# Patient Record
Sex: MALE | URBAN - NONMETROPOLITAN AREA
[De-identification: names, ages, dates, MRNs, and addresses within clinical notes are randomized per-mention and may not be internally consistent; named-entity substitution may affect disease eponyms.]

---

## 2019-08-12 ENCOUNTER — OFFICE VISIT (OUTPATIENT)
Dept: FAMILY MEDICINE | Facility: CLINIC | Age: 24
End: 2019-08-12

## 2019-08-12 DIAGNOSIS — Z02.89 HEALTH EXAMINATION OF DEFINED SUBPOPULATION: Primary | ICD-10-CM

## 2019-08-12 LAB
BILIRUB UR QL: NORMAL
CLARITY: CLEAR
COLOR UR: YELLOW
GLUCOSE URINE: NORMAL MG/DL
HGB UR QL: NORMAL
KETONES UR QL: NORMAL MG/DL
NITRITE UR QL STRIP: NORMAL
PH UR STRIP: 8.5 PH (ref 5–7)
PROT UR QL: NORMAL MG/DL
SP GR UR STRIP: 1.01 (ref 1–1.03)
SPECIMEN VOL UR: NORMAL ML
UROBILINOGEN UR QL STRIP: 0.2 EU/DL (ref 0.2–1)
WBC #/AREA URNS HPF: NORMAL /[HPF]

## 2019-08-12 PROCEDURE — 81003 URINALYSIS AUTO W/O SCOPE: CPT | Performed by: NURSE PRACTITIONER

## 2019-08-12 PROCEDURE — 99499 UNLISTED E&M SERVICE: CPT | Performed by: NURSE PRACTITIONER

## 2019-08-12 NOTE — PROGRESS NOTES
"Form [id number]                                  [facility] [id number]  Expiration Date: 2019  MEDICAL EXAMINATION REPORT FORM  (FOR  MEDICAL CERTIFICATION)    SECTION 1.  Information (to be filled out by )    PERSONAL INFORMATION    Last Name: Nishant  First Name: Giovanny Delaney Initial: ARNALDO  : 1995      Age: 24        [address] Address: [address]    City: [place]   State/Province: [place]   Zip Code: [address]  's License Number: [id number]      Issuing State/Province: [place]       Phone: [phone]     Gender: male  E-mail (optional): [email or web address]  CLP/CDL Applicant Cavazos*: yes   ID Verified by**:  MM  Has your [employer] medical certificate ever been denied or issued for less than 2 years? NO     (*CLP/CDL Applicant/Cavazos: See instructions for definitions)  (** ID verified By:Record what type of photo ID was used to verify the identity of the , e.g., CDL,'s license, passport)       HEALTH HISTORY    Have you ever had surgery? If \"yes\", please list and explain below.  [  ] Yes [x  ]  No  [  ] Not Sure         Are you currently taking medications (prescription, over-the-counter, herbal remedies, diet supplements)? If \"yes,\" please describe below.   [  ] Yes [ x ]  No  [  ] Not Sure          HEALTH HISTORY (continued)          Do you have or have you ever had:                                                     Not  Yes    No     Sure                                                                          Not    Yes     No   Sure    1. Head/brain injuries or illness (e.g., concussion)  x     16. Dizziness, headaches, numbness, tingling, or memory loss  x       2. Seizures, epilepsy  x     17. Unexplained weight loss  x       3. Eye problems (except glasses or contacts)  x     18. Stroke, mini stroke (TIA), paralysis, or weakness  x       4. Ear and/or hearing problems  x     19. Missing or limited use of arm, hand, finger, leg, foot, toe  " "x       5. Heart disease, heart attack, bypass, or other heart problems  x     20. Neck or back problems  x       6. Pacemaker, stents, implantable devices, or other heart procedures  x     21. Bone, muscle, joint or nerve problems  x       7. High blood preassure  x     22. Blood clots or bleeding problems  x       8. High cholesterol  x     23. Cancer  x       9. Chronic (long term) cough, shortness of breath, or other breathing problems  x     24. Chronic (long term) infection or other chronic disease  x       10. Lung disease (e.g., asthma)  x     25. Sleep disorders, pauses in breathing while asleep, daytime sleepiness, loud snoring  x       11. Kidney problems, kidney stones, or pain/problems with urination  x     26. Have you ever had a sleep test? (e.g., sleep apnea)  x       12. Stomach, liver, or digestive problems  x     27. Have you ever spent the night in the hospital?  x       13. Diabetes or blood sugar problems  x     28. Have you ever had a broken bone?  x             Insulin used  x     29. Have you ever used or do you now use tobacco?  x       14. Anxiety, depression, nervousness, other mental health problems  x     30. Do you currently drink alcohol?  x        15. Fainting or passing out  x     31.  Have you used an illegal substance within the past two years?   x         32. Have you ever failed a drug test or been dependent on an illegal substance?   x         Other health condition(s) not described above: [  ] Yes [x  ]  No  [  ] Not Sure         Did you answer \"yes\" to any of questions 1-32?  If so, please comment further on those health conditions below: [ x ] Yes [  ]  No  [  ] Not Sure    30. I have a few alcoholic beverages on the weekends.            'S SIGNATURE  I certify that the above information is accurate and complete. I understand that inaccurate, false or missing information may invalidate the examination and my Medical Examiner's Certificate, that submission of " "fraudulent or intentionally false information is a violation of 49CFR 390.35, and that submission of fraudulent or intentionally false information may subject me to civil or ciminal penalties under 49 .37 and 49  Appendices A and B.     's signature:____________________________        Date: 8/12/2019                                         Signature if printed       Section 2. Examination Report (to be filled out by the medical examiner)      HEALTH HISTORY REVIEW  Review and discuss pertinent  answers and any available medical records. Comment on the 's responses to the \"health history\" questions that may affect the 's safe operation of a commercial motor vehicle (CMV).       Healthy  occasional ETOH on the weekend              TESTING     Pulse rate: 64     Pulse rhythm regular: YES  Height: 6 feet 0 inches  Weight: 170 pounds    Blood Pressure  Blood Pressure: 110 Systolic  80 Diastolic  Sitting yes  Second Reading (optional)   Other Testing if indicated           Urinalysis  Urinalysis is required. Numerical readings must be recorded.  Urine Specimen Specific Gravity Protein Blood Sugar    1.015 NEGATIVE NEGATIVE NEGATIVE   Protein, blood or sugar in the urine may be an indication for further testing to rule out any underlying medical problem.    Vision  Standard is at least 20/40 acuity (Snellen) in each eye with or without correction. At least 70  field of vision in horizontal meridian measured in each eye. The use of corrective lenses should be noted on the Medical Examiner's Certificate.    ACUITY UNCORRECTED CORRECTED HORIZONTAL FIELD OF VISION   Right Eye 20/25 N/A Greater than 70 degrees   Left Eye 20/20 N/A Greater than 70 degrees   Both Eyes 20/16 N/A      Applicant can recognize and distinguish among traffic control signals and devices showing red, green and reina colors? Yes    Monocular vision: No    Referred to ophthalmologist or optometrist?  " No    Received documentation from ophthalmologist or optometrist?  No    HEARING   Standard: Must first perceive forced whispered voice at not less than 5 feet OR average hearing loss of less than or equal to 40 dB, in better ear (with or without hearing aid).    Check if hearing aid used for test:  Neither    Whispered voice test:    Record the distance from the individual at which a forced whispered voice can first be heard:        Right Ear: greater than 5 feet                  Left Ear: greater than 5 feet             PHYSICAL EXAMINATION  The presence of a certain condition may not necessarily disqualify a , particularly if the condition is controlled adequately, is not likely to worsen, or is readily amenable to treatment. Even if a condition does not disqualify a , the Medical Examiner may consider deferring the  temporarily. Also, the  should be advised to take the necessary steps to correct the condition as soon as possible, particularly if neglecting the condition, could result in more serious illness that might affect driving.    Check the body systems for abnormalities.  BODY SYSTEM Normal or Abnormal   1. General  Normal   2. Skin Normal   3. Eyes Normal   4. Ears Normal   5. Mouth/throat Normal   6. Cardiovascular Normal   7. Lungs/chest Normal   8. Abdomen Normal   9. Genito-urinary System including hernias Normal   10. Back/Spine Normal   11. Extremities/joints Normal   12. Neurological system including reflexes Normal   13. Gait Normal   14. Vascular system Normal     Discuss any abnormal answers in detail in the space below and indicate whether it would affect the 's ability to operate a CMV. Enter applicable item number before each comment.                 Please complete only one of the following (Federal or State) Medical Examiner Determination sections:    MEDICAL EXAMINER DETERMINATION (Federal)  Use this section for examinations performed in accordance with the  Federal Motor Carrier Safety Regulations (49 ..49):    Meets standards in 49 .41; qualifies for 2-year certificate            If the  meets the standards outlined in 49 .41, then complete a Medical Examiner's Certificate as stated in 49 .43(h), as appropriate.     I have performed this evaluation for certification. I have personally reviewed all available records and recorded information pertaining to this evaluation, and attest that to the best of my knowledge, I believe it to be true and correct.    Medical Examiner's Signature: _________________________________________                                                                                   (if printed)    Medical Examiner's Name: Swetha Hodges NP  Medical Examiner's Address:   [address] [address]  [phone]  Dept: [phone]    Date Certificate Signed: 8/12/2019    Medical Examiner's State License, Certificate, or Registration Number: [id number]      Issuing State:  [place]    Advanced Practice Nurse    National Registry Number:  [id number]                  Medical Examiner's Certificate Expiration Date: 8/12/2021                          Date submitted to registry: 8/12/2019  Submitted by: Miguelina BELL CMA

## 2019-09-04 ENCOUNTER — TELEPHONE (OUTPATIENT)
Dept: FAMILY MEDICINE | Facility: CLINIC | Age: 24
End: 2019-09-04

## 2019-09-04 NOTE — TELEPHONE ENCOUNTER
Can you look into this? Wondering if there was an issue with it being submitted?  Lisbeth ESCOBEDO

## 2019-09-04 NOTE — TELEPHONE ENCOUNTER
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   Station  sent his message to DOT appointment scheduling.    I reached out to Giovanny and he said he received a letter last week that he did not have an updated medical card on file.    I told patient that a request would be made for the provider to contact him regarding the situation.      Please advise Giovanny at [phone]    Thank you.  Cherrie Lizarraga [employer] Appointment Scheduling Dept.